# Patient Record
Sex: FEMALE | Race: WHITE
[De-identification: names, ages, dates, MRNs, and addresses within clinical notes are randomized per-mention and may not be internally consistent; named-entity substitution may affect disease eponyms.]

---

## 2021-03-25 ENCOUNTER — NURSE TRIAGE (OUTPATIENT)
Dept: OTHER | Facility: CLINIC | Age: 26
End: 2021-03-25

## 2021-03-25 NOTE — TELEPHONE ENCOUNTER
Reason for Disposition   Itchy insect bite    Answer Assessment - Initial Assessment Questions  1. TYPE of INSECT: \"What type of insect was it? \"       Pt is not sure, possibly a spider    2. ONSET: \"When did you get bitten? \"       Pt noticed the bites this morning     3. LOCATION: \"Where is the insect bite located? \"       Two on her face, one on her neck, one on her back, one on each arm and one on her left ankle    4. REDNESS: \"Is the area red or pink? \" If so, ask \"What size is area of redness? \" (inches or cm). \"When did the redness start? \"     Bites are red. Redness is about an inch in diameter on her face and back and 3-4 inches on her arms and ankle. 5. PAIN: \"Is there any pain? \" If so, ask: \"How bad is it? \"  (Scale 1-10; or mild, moderate, severe)      Denies pain    6. ITCHING: \"Does it itch? \" If so, ask: \"How bad is the itch? \"     - MILD: doesn't interfere with normal activities    - MODERATE-SEVERE: interferes with work, school, sleep, or other activities       Mild itching    7. SWELLING: \"How big is the swelling? \" (inches, cm, or compare to coins)     The bites on her ankle and arms are very swollen per pt, about 3 inches of swelling     8. OTHER SYMPTOMS: \"Do you have any other symptoms? \"  (e.g., difficulty breathing, hives)      Denies     9. PREGNANCY: \"Is there any chance you are pregnant? \" \"When was your last menstrual period? \"      Denies    Protocols used: INSECT BITE-ADULT-    Brief description of triage:  Pt is calling with c/o insect bites that she noticed this morning. She thinks that they are possibly spider bites. Triage indicates: Home care at this time. Care advice provided, patient verbalizes understanding; denies any other questions or concerns; instructed to call back for any new or worsening symptoms. This triage is a result of a call to [address] [place] [address]. Please do not respond to the triage nurse through this encounter.  Any subsequent communication should be directly with the patient.